# Patient Record
Sex: FEMALE | Race: WHITE | NOT HISPANIC OR LATINO | URBAN - METROPOLITAN AREA
[De-identification: names, ages, dates, MRNs, and addresses within clinical notes are randomized per-mention and may not be internally consistent; named-entity substitution may affect disease eponyms.]

---

## 2020-08-10 ENCOUNTER — TELEPHONE ENCOUNTER (OUTPATIENT)
Dept: URBAN - METROPOLITAN AREA CLINIC 92 | Facility: CLINIC | Age: 70
End: 2020-08-10

## 2020-08-13 ENCOUNTER — TELEPHONE ENCOUNTER (OUTPATIENT)
Dept: URBAN - METROPOLITAN AREA CLINIC 92 | Facility: CLINIC | Age: 70
End: 2020-08-13

## 2020-08-25 ENCOUNTER — OFFICE VISIT (OUTPATIENT)
Dept: RURAL MEDICAL CENTER 4 | Facility: MEDICAL CENTER | Age: 70
End: 2020-08-25
Payer: MEDICARE

## 2020-08-25 DIAGNOSIS — Z86.010 H/O ADENOMATOUS POLYP OF COLON: ICD-10-CM

## 2020-08-25 DIAGNOSIS — K29.60 ADENOPAPILLOMATOSIS GASTRICA: ICD-10-CM

## 2020-08-25 PROCEDURE — 43239 EGD BIOPSY SINGLE/MULTIPLE: CPT | Performed by: INTERNAL MEDICINE

## 2020-08-25 PROCEDURE — G0105 COLORECTAL SCRN; HI RISK IND: HCPCS | Performed by: INTERNAL MEDICINE

## 2020-08-25 PROCEDURE — G9936 PMH PLYP/NEO CO/RECT/JUN/ANS: HCPCS | Performed by: INTERNAL MEDICINE

## 2021-12-13 ENCOUNTER — TELEPHONE ENCOUNTER (OUTPATIENT)
Dept: URBAN - METROPOLITAN AREA CLINIC 23 | Facility: CLINIC | Age: 71
End: 2021-12-13

## 2022-01-26 ENCOUNTER — OFFICE VISIT (OUTPATIENT)
Dept: RURAL CLINIC 8 | Facility: CLINIC | Age: 72
End: 2022-01-26
Payer: MEDICARE

## 2022-01-26 DIAGNOSIS — R14.3 FLATULENCE: ICD-10-CM

## 2022-01-26 DIAGNOSIS — Z86.010 HISTORY OF COLON POLYPS: ICD-10-CM

## 2022-01-26 DIAGNOSIS — K92.1 HEMATOCHEZIA: ICD-10-CM

## 2022-01-26 DIAGNOSIS — K62.89 RECTAL PAIN: ICD-10-CM

## 2022-01-26 PROBLEM — 428283002: Status: ACTIVE | Noted: 2022-01-26

## 2022-01-26 PROCEDURE — 99214 OFFICE O/P EST MOD 30 MIN: CPT | Performed by: INTERNAL MEDICINE

## 2022-01-26 RX ORDER — RALOXIFENE HCL 60 MG
TAKE 1 TABLET (60 MG) BY ORAL ROUTE ONCE DAILY TABLET ORAL 1
Qty: 0 | Refills: 0 | Status: ACTIVE | COMMUNITY
Start: 1900-01-01

## 2022-01-26 RX ORDER — FESOTERODINE FUMARATE 8 MG/1
TAKE 1 TABLET (8 MG) BY ORAL ROUTE ONCE DAILY TABLET, FILM COATED, EXTENDED RELEASE ORAL 1
Qty: 0 | Refills: 0 | Status: ACTIVE | COMMUNITY
Start: 1900-01-01

## 2022-01-26 RX ORDER — LATANOPROST/PF 0.005 %
DROPS OPHTHALMIC (EYE)
Qty: 0 | Refills: 0 | Status: ACTIVE | COMMUNITY
Start: 1900-01-01

## 2022-01-26 RX ORDER — ESTRADIOL 0.1 MG/G
CREAM VAGINAL
Qty: 0 | Refills: 0 | Status: ACTIVE | COMMUNITY
Start: 1900-01-01

## 2022-01-26 RX ORDER — MIRABEGRON 50 MG/1
TAKE 1 TABLET (50 MG) BY ORAL ROUTE ONCE DAILY SWALLOWING WHOLE WITH WATER. DO NOT CRUSH, CHEW AND/OR DIVIDE TABLET, FILM COATED, EXTENDED RELEASE ORAL 1
Qty: 0 | Refills: 0 | Status: ACTIVE | COMMUNITY
Start: 1900-01-01

## 2022-01-26 RX ORDER — ACYCLOVIR 800 MG/1
TABLET ORAL
Qty: 0 | Refills: 0 | Status: ACTIVE | COMMUNITY
Start: 1900-01-01

## 2022-01-26 RX ORDER — DOXEPIN HYDROCHLORIDE 25 MG/1
CAPSULE ORAL
Qty: 0 | Refills: 0 | Status: ACTIVE | COMMUNITY
Start: 1900-01-01

## 2022-01-26 NOTE — HPI-TODAY'S VISIT:
in August of 2020, we did colonoscopy for polyp surveillance. she had no polyps just internal hemorrhoids. she started to have some rectal bleeding in November. she then started to have some blood and clots. she says on the 16th of this month she felt like she had to have a bowel movement and she stood up and had gas come out with blood and clots. she is having some soreness on the inside of the rectum.

## 2022-01-28 ENCOUNTER — OFFICE VISIT (OUTPATIENT)
Dept: RURAL MEDICAL CENTER 4 | Facility: MEDICAL CENTER | Age: 72
End: 2022-01-28
Payer: MEDICARE

## 2022-01-28 DIAGNOSIS — C21.1: ICD-10-CM

## 2022-01-28 PROCEDURE — 45331 SIGMOIDOSCOPY AND BIOPSY: CPT | Performed by: INTERNAL MEDICINE

## 2022-01-28 RX ORDER — LATANOPROST/PF 0.005 %
DROPS OPHTHALMIC (EYE)
Qty: 0 | Refills: 0 | Status: ACTIVE | COMMUNITY
Start: 1900-01-01

## 2022-01-28 RX ORDER — MIRABEGRON 50 MG/1
TAKE 1 TABLET (50 MG) BY ORAL ROUTE ONCE DAILY SWALLOWING WHOLE WITH WATER. DO NOT CRUSH, CHEW AND/OR DIVIDE TABLET, FILM COATED, EXTENDED RELEASE ORAL 1
Qty: 0 | Refills: 0 | Status: ACTIVE | COMMUNITY
Start: 1900-01-01

## 2022-01-28 RX ORDER — ESTRADIOL 0.1 MG/G
CREAM VAGINAL
Qty: 0 | Refills: 0 | Status: ACTIVE | COMMUNITY
Start: 1900-01-01

## 2022-01-28 RX ORDER — RALOXIFENE HCL 60 MG
TAKE 1 TABLET (60 MG) BY ORAL ROUTE ONCE DAILY TABLET ORAL 1
Qty: 0 | Refills: 0 | Status: ACTIVE | COMMUNITY
Start: 1900-01-01

## 2022-01-28 RX ORDER — FESOTERODINE FUMARATE 8 MG/1
TAKE 1 TABLET (8 MG) BY ORAL ROUTE ONCE DAILY TABLET, FILM COATED, EXTENDED RELEASE ORAL 1
Qty: 0 | Refills: 0 | Status: ACTIVE | COMMUNITY
Start: 1900-01-01

## 2022-01-28 RX ORDER — ACYCLOVIR 800 MG/1
TABLET ORAL
Qty: 0 | Refills: 0 | Status: ACTIVE | COMMUNITY
Start: 1900-01-01

## 2022-01-28 RX ORDER — DOXEPIN HYDROCHLORIDE 25 MG/1
CAPSULE ORAL
Qty: 0 | Refills: 0 | Status: ACTIVE | COMMUNITY
Start: 1900-01-01

## 2022-01-31 ENCOUNTER — WEB ENCOUNTER (OUTPATIENT)
Dept: RURAL CLINIC 8 | Facility: CLINIC | Age: 72
End: 2022-01-31

## 2022-01-31 ENCOUNTER — TELEPHONE ENCOUNTER (OUTPATIENT)
Dept: URBAN - METROPOLITAN AREA CLINIC 92 | Facility: CLINIC | Age: 72
End: 2022-01-31

## 2022-02-01 ENCOUNTER — TELEPHONE ENCOUNTER (OUTPATIENT)
Dept: URBAN - METROPOLITAN AREA CLINIC 105 | Facility: CLINIC | Age: 72
End: 2022-02-01

## 2022-02-01 ENCOUNTER — TELEPHONE ENCOUNTER (OUTPATIENT)
Dept: URBAN - METROPOLITAN AREA CLINIC 92 | Facility: CLINIC | Age: 72
End: 2022-02-01

## 2022-02-03 ENCOUNTER — LAB OUTSIDE AN ENCOUNTER (OUTPATIENT)
Dept: RURAL CLINIC 8 | Facility: CLINIC | Age: 72
End: 2022-02-03

## 2022-02-03 PROBLEM — 32110003: Status: ACTIVE | Noted: 2022-02-03

## 2022-02-04 ENCOUNTER — TELEPHONE ENCOUNTER (OUTPATIENT)
Dept: URBAN - METROPOLITAN AREA CLINIC 105 | Facility: CLINIC | Age: 72
End: 2022-02-04

## 2022-02-14 ENCOUNTER — WEB ENCOUNTER (OUTPATIENT)
Dept: RURAL CLINIC 8 | Facility: CLINIC | Age: 72
End: 2022-02-14

## 2022-03-09 ENCOUNTER — OFFICE VISIT (OUTPATIENT)
Dept: RURAL CLINIC 8 | Facility: CLINIC | Age: 72
End: 2022-03-09

## 2022-03-12 ENCOUNTER — WEB ENCOUNTER (OUTPATIENT)
Dept: RURAL CLINIC 8 | Facility: CLINIC | Age: 72
End: 2022-03-12

## 2022-03-16 ENCOUNTER — WEB ENCOUNTER (OUTPATIENT)
Dept: RURAL CLINIC 8 | Facility: CLINIC | Age: 72
End: 2022-03-16

## 2022-03-28 ENCOUNTER — WEB ENCOUNTER (OUTPATIENT)
Dept: RURAL CLINIC 8 | Facility: CLINIC | Age: 72
End: 2022-03-28

## 2022-03-29 ENCOUNTER — WEB ENCOUNTER (OUTPATIENT)
Dept: RURAL CLINIC 8 | Facility: CLINIC | Age: 72
End: 2022-03-29

## 2022-04-04 ENCOUNTER — WEB ENCOUNTER (OUTPATIENT)
Dept: RURAL CLINIC 8 | Facility: CLINIC | Age: 72
End: 2022-04-04

## 2022-04-13 ENCOUNTER — WEB ENCOUNTER (OUTPATIENT)
Dept: RURAL CLINIC 2 | Facility: CLINIC | Age: 72
End: 2022-04-13

## 2022-04-13 ENCOUNTER — OFFICE VISIT (OUTPATIENT)
Dept: RURAL CLINIC 8 | Facility: CLINIC | Age: 72
End: 2022-04-13
Payer: MEDICARE

## 2022-04-13 DIAGNOSIS — K92.1 HEMATOCHEZIA: ICD-10-CM

## 2022-04-13 DIAGNOSIS — C21.0 ANAL CANCER: ICD-10-CM

## 2022-04-13 DIAGNOSIS — R93.5 ABNORMAL ABDOMINAL CT SCAN: ICD-10-CM

## 2022-04-13 DIAGNOSIS — K59.01 SLOW TRANSIT CONSTIPATION: ICD-10-CM

## 2022-04-13 DIAGNOSIS — R10.13 EPIGASTRIC ABDOMINAL PAIN: ICD-10-CM

## 2022-04-13 PROBLEM — 35298007: Status: ACTIVE | Noted: 2022-04-13

## 2022-04-13 PROCEDURE — 99214 OFFICE O/P EST MOD 30 MIN: CPT | Performed by: INTERNAL MEDICINE

## 2022-04-13 RX ORDER — DOXEPIN HYDROCHLORIDE 25 MG/1
CAPSULE ORAL
Qty: 0 | Refills: 0 | Status: ACTIVE | COMMUNITY
Start: 1900-01-01

## 2022-04-13 RX ORDER — ESTRADIOL 0.1 MG/G
CREAM VAGINAL
Qty: 0 | Refills: 0 | Status: ACTIVE | COMMUNITY
Start: 1900-01-01

## 2022-04-13 RX ORDER — RALOXIFENE HCL 60 MG
TAKE 1 TABLET (60 MG) BY ORAL ROUTE ONCE DAILY TABLET ORAL 1
Qty: 0 | Refills: 0 | Status: ACTIVE | COMMUNITY
Start: 1900-01-01

## 2022-04-13 RX ORDER — ACYCLOVIR 800 MG/1
TABLET ORAL
Qty: 0 | Refills: 0 | Status: ACTIVE | COMMUNITY
Start: 1900-01-01

## 2022-04-13 RX ORDER — LATANOPROST/PF 0.005 %
DROPS OPHTHALMIC (EYE)
Qty: 0 | Refills: 0 | Status: ACTIVE | COMMUNITY
Start: 1900-01-01

## 2022-04-13 RX ORDER — MIRABEGRON 50 MG/1
TAKE 1 TABLET (50 MG) BY ORAL ROUTE ONCE DAILY SWALLOWING WHOLE WITH WATER. DO NOT CRUSH, CHEW AND/OR DIVIDE TABLET, FILM COATED, EXTENDED RELEASE ORAL 1
Qty: 0 | Refills: 0 | Status: ACTIVE | COMMUNITY
Start: 1900-01-01

## 2022-04-13 RX ORDER — FESOTERODINE FUMARATE 8 MG/1
TAKE 1 TABLET (8 MG) BY ORAL ROUTE ONCE DAILY TABLET, FILM COATED, EXTENDED RELEASE ORAL 1
Qty: 0 | Refills: 0 | Status: ACTIVE | COMMUNITY
Start: 1900-01-01

## 2022-04-13 NOTE — HPI-TODAY'S VISIT:
in August of 2020, we did colonoscopy for polyp surveillance. she had no polyps just internal hemorrhoids. she started to have some rectal bleeding in November. she then started to have some blood and clots. she says on the 16th of this month she felt like she had to have a bowel movement and she stood up and had gas come out with blood and clots. she is having some soreness on the inside of the rectum. -  04/13/2022:  In January 2022 I performed flexible sigmoidoscopy for this patient because of rectal bleeding.  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  She has been getting chemo and radiation treatments - she comes with her . she is still struggling with some constipation. took mag citrate and got bad uncontrolable diarrhea. was given some Linzess but has not taken but about 1 dose of that.  She has not had any more rectal bleeding.  She reports that she goes soon for a follow-up PET scan. -   We communicated earlier via the patient portal and she was having some upper abdominal discomfort when eating and we have an endoscopy planned for later this month for further investigation of those symptoms.

## 2022-04-14 ENCOUNTER — WEB ENCOUNTER (OUTPATIENT)
Dept: RURAL CLINIC 8 | Facility: CLINIC | Age: 72
End: 2022-04-14

## 2022-04-16 ENCOUNTER — WEB ENCOUNTER (OUTPATIENT)
Dept: RURAL CLINIC 8 | Facility: CLINIC | Age: 72
End: 2022-04-16

## 2022-04-28 ENCOUNTER — OFFICE VISIT (OUTPATIENT)
Dept: RURAL MEDICAL CENTER 4 | Facility: MEDICAL CENTER | Age: 72
End: 2022-04-28
Payer: MEDICARE

## 2022-04-28 DIAGNOSIS — K31.89 ACQUIRED DEFORMITY OF DUODENUM: ICD-10-CM

## 2022-04-28 DIAGNOSIS — R10.13 ABDOMINAL DISCOMFORT, EPIGASTRIC: ICD-10-CM

## 2022-04-28 PROCEDURE — 43239 EGD BIOPSY SINGLE/MULTIPLE: CPT | Performed by: INTERNAL MEDICINE

## 2022-04-28 RX ORDER — DOXEPIN HYDROCHLORIDE 25 MG/1
CAPSULE ORAL
Qty: 0 | Refills: 0 | Status: ACTIVE | COMMUNITY
Start: 1900-01-01

## 2022-04-28 RX ORDER — MIRABEGRON 50 MG/1
TAKE 1 TABLET (50 MG) BY ORAL ROUTE ONCE DAILY SWALLOWING WHOLE WITH WATER. DO NOT CRUSH, CHEW AND/OR DIVIDE TABLET, FILM COATED, EXTENDED RELEASE ORAL 1
Qty: 0 | Refills: 0 | Status: ACTIVE | COMMUNITY
Start: 1900-01-01

## 2022-04-28 RX ORDER — ESTRADIOL 0.1 MG/G
CREAM VAGINAL
Qty: 0 | Refills: 0 | Status: ACTIVE | COMMUNITY
Start: 1900-01-01

## 2022-04-28 RX ORDER — FESOTERODINE FUMARATE 8 MG/1
TAKE 1 TABLET (8 MG) BY ORAL ROUTE ONCE DAILY TABLET, FILM COATED, EXTENDED RELEASE ORAL 1
Qty: 0 | Refills: 0 | Status: ACTIVE | COMMUNITY
Start: 1900-01-01

## 2022-04-28 RX ORDER — ACYCLOVIR 800 MG/1
TABLET ORAL
Qty: 0 | Refills: 0 | Status: ACTIVE | COMMUNITY
Start: 1900-01-01

## 2022-04-28 RX ORDER — RALOXIFENE HCL 60 MG
TAKE 1 TABLET (60 MG) BY ORAL ROUTE ONCE DAILY TABLET ORAL 1
Qty: 0 | Refills: 0 | Status: ACTIVE | COMMUNITY
Start: 1900-01-01

## 2022-04-28 RX ORDER — LATANOPROST/PF 0.005 %
DROPS OPHTHALMIC (EYE)
Qty: 0 | Refills: 0 | Status: ACTIVE | COMMUNITY
Start: 1900-01-01

## 2022-05-16 ENCOUNTER — WEB ENCOUNTER (OUTPATIENT)
Dept: RURAL CLINIC 8 | Facility: CLINIC | Age: 72
End: 2022-05-16

## 2022-05-17 ENCOUNTER — WEB ENCOUNTER (OUTPATIENT)
Dept: RURAL CLINIC 8 | Facility: CLINIC | Age: 72
End: 2022-05-17

## 2022-06-01 ENCOUNTER — WEB ENCOUNTER (OUTPATIENT)
Dept: RURAL CLINIC 8 | Facility: CLINIC | Age: 72
End: 2022-06-01

## 2022-06-23 ENCOUNTER — OFFICE VISIT (OUTPATIENT)
Dept: RURAL MEDICAL CENTER 4 | Facility: MEDICAL CENTER | Age: 72
End: 2022-06-23
Payer: MEDICARE

## 2022-06-23 DIAGNOSIS — Z85.048 HISTORY OF ANAL CANCER: ICD-10-CM

## 2022-06-23 PROCEDURE — 45330 DIAGNOSTIC SIGMOIDOSCOPY: CPT | Performed by: INTERNAL MEDICINE

## 2022-10-05 ENCOUNTER — P2P PATIENT RECORD (OUTPATIENT)
Age: 72
End: 2022-10-05

## 2022-11-09 ENCOUNTER — OFFICE VISIT (OUTPATIENT)
Dept: RURAL CLINIC 8 | Facility: CLINIC | Age: 72
End: 2022-11-09
Payer: MEDICARE

## 2022-11-09 VITALS
SYSTOLIC BLOOD PRESSURE: 103 MMHG | TEMPERATURE: 97.8 F | HEART RATE: 83 BPM | WEIGHT: 119 LBS | BODY MASS INDEX: 19.83 KG/M2 | DIASTOLIC BLOOD PRESSURE: 69 MMHG | HEIGHT: 65 IN

## 2022-11-09 DIAGNOSIS — Z92.21 HISTORY OF CHEMOTHERAPY: ICD-10-CM

## 2022-11-09 DIAGNOSIS — Z92.3 HISTORY OF RADIATION THERAPY: ICD-10-CM

## 2022-11-09 DIAGNOSIS — Z90.49 HISTORY OF CHOLECYSTECTOMY: ICD-10-CM

## 2022-11-09 DIAGNOSIS — R94.8 ABNORMAL GASTROINTESTINAL PET SCAN: ICD-10-CM

## 2022-11-09 DIAGNOSIS — C21.0 ANAL CANCER: ICD-10-CM

## 2022-11-09 PROBLEM — 429479009: Status: ACTIVE | Noted: 2022-11-09

## 2022-11-09 PROBLEM — 428882003: Status: ACTIVE | Noted: 2022-11-09

## 2022-11-09 PROCEDURE — 99213 OFFICE O/P EST LOW 20 MIN: CPT | Performed by: INTERNAL MEDICINE

## 2022-11-09 RX ORDER — MIRABEGRON 50 MG/1
TAKE 1 TABLET (50 MG) BY ORAL ROUTE ONCE DAILY SWALLOWING WHOLE WITH WATER. DO NOT CRUSH, CHEW AND/OR DIVIDE TABLET, FILM COATED, EXTENDED RELEASE ORAL 1
Qty: 0 | Refills: 0 | Status: ACTIVE | COMMUNITY
Start: 1900-01-01

## 2022-11-09 RX ORDER — PREGABALIN 75 MG/1
1 CAPSULE CAPSULE ORAL TWICE A DAY
Status: ACTIVE | COMMUNITY

## 2022-11-09 RX ORDER — ANTIOX #8/OM3/DHA/EPA/LUT/ZEAX 250-2.5 MG
AS DIRECTED CAPSULE ORAL
Status: ACTIVE | COMMUNITY

## 2022-11-09 RX ORDER — RALOXIFENE HCL 60 MG
TAKE 1 TABLET (60 MG) BY ORAL ROUTE ONCE DAILY TABLET ORAL 1
Qty: 0 | Refills: 0 | Status: ACTIVE | COMMUNITY
Start: 1900-01-01

## 2022-11-09 RX ORDER — SPIRONOLACTONE 50 MG/1
1 TABLET TABLET, FILM COATED ORAL ONCE A DAY
Status: ACTIVE | COMMUNITY

## 2022-11-09 RX ORDER — ALPHA-D-GALACTOSIDASE 400 UNIT
1 TABLET AT BEDTIME TABLET ORAL ONCE A DAY
Status: ACTIVE | COMMUNITY

## 2022-11-09 RX ORDER — ACYCLOVIR 800 MG/1
TABLET ORAL
Qty: 0 | Refills: 0 | Status: ACTIVE | COMMUNITY
Start: 1900-01-01

## 2022-11-09 RX ORDER — FESOTERODINE FUMARATE 8 MG/1
TAKE 1 TABLET (8 MG) BY ORAL ROUTE ONCE DAILY TABLET, FILM COATED, EXTENDED RELEASE ORAL 1
Qty: 0 | Refills: 0 | Status: ACTIVE | COMMUNITY
Start: 1900-01-01

## 2022-11-09 RX ORDER — ESTRADIOL 0.1 MG/G
CREAM VAGINAL
Qty: 0 | Refills: 0 | Status: ACTIVE | COMMUNITY
Start: 1900-01-01

## 2022-11-09 RX ORDER — LATANOPROST/PF 0.005 %
DROPS OPHTHALMIC (EYE)
Qty: 0 | Refills: 0 | Status: ACTIVE | COMMUNITY
Start: 1900-01-01

## 2022-11-09 RX ORDER — DOXEPIN HYDROCHLORIDE 25 MG/1
CAPSULE ORAL
Qty: 0 | Refills: 0 | Status: ACTIVE | COMMUNITY
Start: 1900-01-01

## 2022-11-09 NOTE — HPI-TODAY'S VISIT:
in August of 2020, we did colonoscopy for polyp surveillance. she had no polyps just internal hemorrhoids. she started to have some rectal bleeding in November. she then started to have some blood and clots. she says on the 16th of this month she felt like she had to have a bowel movement and she stood up and had gas come out with blood and clots. she is having some soreness on the inside of the rectum. -  04/13/2022:  In January 2022 I performed flexible sigmoidoscopy for this patient because of rectal bleeding.  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  She has been getting chemo and radiation treatments - she comes with her . she is still struggling with some constipation. took mag citrate and got bad uncontrolable diarrhea. was given some Linzess but has not taken but about 1 dose of that.  She has not had any more rectal bleeding.  She reports that she goes soon for a follow-up PET scan. -   We communicated earlier via the patient portal and she was having some upper abdominal discomfort when eating and we have an endoscopy planned for later this month for further investigation of those symptoms. - 11/9/2022: she continues to do well without rectal bleeding.  She has no rectal or anal pain.  She does have a recent PET scan that did show some persistent activity within the anal canal.

## 2022-12-29 ENCOUNTER — OFFICE VISIT (OUTPATIENT)
Dept: RURAL MEDICAL CENTER 4 | Facility: MEDICAL CENTER | Age: 72
End: 2022-12-29
Payer: MEDICARE

## 2022-12-29 DIAGNOSIS — Z85.048 H/O MALIGNANT NEOPLASM OF RECTUM: ICD-10-CM

## 2022-12-29 PROBLEM — 363490009: Status: ACTIVE | Noted: 2022-04-13

## 2022-12-29 PROCEDURE — 45330 DIAGNOSTIC SIGMOIDOSCOPY: CPT | Performed by: INTERNAL MEDICINE

## 2022-12-29 RX ORDER — PREGABALIN 75 MG/1
1 CAPSULE CAPSULE ORAL TWICE A DAY
Status: ACTIVE | COMMUNITY

## 2022-12-29 RX ORDER — ESTRADIOL 0.1 MG/G
CREAM VAGINAL
Qty: 0 | Refills: 0 | Status: ACTIVE | COMMUNITY
Start: 1900-01-01

## 2022-12-29 RX ORDER — RALOXIFENE HCL 60 MG
TAKE 1 TABLET (60 MG) BY ORAL ROUTE ONCE DAILY TABLET ORAL 1
Qty: 0 | Refills: 0 | Status: ACTIVE | COMMUNITY
Start: 1900-01-01

## 2022-12-29 RX ORDER — FESOTERODINE FUMARATE 8 MG/1
TAKE 1 TABLET (8 MG) BY ORAL ROUTE ONCE DAILY TABLET, FILM COATED, EXTENDED RELEASE ORAL 1
Qty: 0 | Refills: 0 | Status: ACTIVE | COMMUNITY
Start: 1900-01-01

## 2022-12-29 RX ORDER — MIRABEGRON 50 MG/1
TAKE 1 TABLET (50 MG) BY ORAL ROUTE ONCE DAILY SWALLOWING WHOLE WITH WATER. DO NOT CRUSH, CHEW AND/OR DIVIDE TABLET, FILM COATED, EXTENDED RELEASE ORAL 1
Qty: 0 | Refills: 0 | Status: ACTIVE | COMMUNITY
Start: 1900-01-01

## 2022-12-29 RX ORDER — SPIRONOLACTONE 50 MG/1
1 TABLET TABLET, FILM COATED ORAL ONCE A DAY
Status: ACTIVE | COMMUNITY

## 2022-12-29 RX ORDER — ACYCLOVIR 800 MG/1
TABLET ORAL
Qty: 0 | Refills: 0 | Status: ACTIVE | COMMUNITY
Start: 1900-01-01

## 2022-12-29 RX ORDER — LATANOPROST/PF 0.005 %
DROPS OPHTHALMIC (EYE)
Qty: 0 | Refills: 0 | Status: ACTIVE | COMMUNITY
Start: 1900-01-01

## 2022-12-29 RX ORDER — ANTIOX #8/OM3/DHA/EPA/LUT/ZEAX 250-2.5 MG
AS DIRECTED CAPSULE ORAL
Status: ACTIVE | COMMUNITY

## 2022-12-29 RX ORDER — ALPHA-D-GALACTOSIDASE 400 UNIT
1 TABLET AT BEDTIME TABLET ORAL ONCE A DAY
Status: ACTIVE | COMMUNITY

## 2022-12-29 RX ORDER — DOXEPIN HYDROCHLORIDE 25 MG/1
CAPSULE ORAL
Qty: 0 | Refills: 0 | Status: ACTIVE | COMMUNITY
Start: 1900-01-01

## 2023-10-04 PROBLEM — 112071000119105: Status: ACTIVE | Noted: 2023-10-04

## 2023-10-11 ENCOUNTER — OFFICE VISIT (OUTPATIENT)
Dept: RURAL CLINIC 8 | Facility: CLINIC | Age: 73
End: 2023-10-11
Payer: MEDICARE

## 2023-10-11 ENCOUNTER — DASHBOARD ENCOUNTERS (OUTPATIENT)
Age: 73
End: 2023-10-11

## 2023-10-11 VITALS
SYSTOLIC BLOOD PRESSURE: 142 MMHG | HEART RATE: 85 BPM | WEIGHT: 110 LBS | HEIGHT: 65 IN | BODY MASS INDEX: 18.33 KG/M2 | DIASTOLIC BLOOD PRESSURE: 85 MMHG | TEMPERATURE: 97.1 F

## 2023-10-11 DIAGNOSIS — Z92.21 HISTORY OF CHEMOTHERAPY: ICD-10-CM

## 2023-10-11 DIAGNOSIS — R63.4 UNINTENDED WEIGHT LOSS: ICD-10-CM

## 2023-10-11 DIAGNOSIS — D50.9 IRON DEFICIENCY ANEMIA, UNSPECIFIED IRON DEFICIENCY ANEMIA TYPE: ICD-10-CM

## 2023-10-11 DIAGNOSIS — Z87.19 HISTORY OF SMALL BOWEL OBSTRUCTION: ICD-10-CM

## 2023-10-11 DIAGNOSIS — Z85.048 HISTORY OF ANAL CANCER: ICD-10-CM

## 2023-10-11 DIAGNOSIS — Z86.010 HISTORY OF COLON POLYPS: ICD-10-CM

## 2023-10-11 DIAGNOSIS — Z92.3 HISTORY OF RADIATION THERAPY: ICD-10-CM

## 2023-10-11 PROBLEM — 87522002: Status: ACTIVE | Noted: 2023-10-11

## 2023-10-11 PROCEDURE — 99214 OFFICE O/P EST MOD 30 MIN: CPT | Performed by: INTERNAL MEDICINE

## 2023-10-11 RX ORDER — DOXEPIN HYDROCHLORIDE 25 MG/1
CAPSULE ORAL
Qty: 0 | Refills: 0 | Status: DISCONTINUED | COMMUNITY
Start: 1900-01-01

## 2023-10-11 RX ORDER — RALOXIFENE HCL 60 MG
TAKE 1 TABLET (60 MG) BY ORAL ROUTE ONCE DAILY TABLET ORAL 1
Qty: 0 | Refills: 0 | Status: ACTIVE | COMMUNITY
Start: 1900-01-01

## 2023-10-11 RX ORDER — SPIRONOLACTONE 50 MG/1
1 TABLET TABLET, FILM COATED ORAL ONCE A DAY
Status: DISCONTINUED | COMMUNITY

## 2023-10-11 RX ORDER — ACYCLOVIR 800 MG/1
TABLET ORAL
Qty: 0 | Refills: 0 | Status: ACTIVE | COMMUNITY
Start: 1900-01-01

## 2023-10-11 RX ORDER — SODIUM, POTASSIUM,MAG SULFATES 17.5-3.13G
177 ML SOLUTION, RECONSTITUTED, ORAL ORAL
Qty: 1 KIT | Refills: 0 | OUTPATIENT
Start: 2023-10-04 | End: 2023-10-05

## 2023-10-11 RX ORDER — FESOTERODINE FUMARATE 8 MG/1
TAKE 1 TABLET (8 MG) BY ORAL ROUTE ONCE DAILY TABLET, FILM COATED, EXTENDED RELEASE ORAL 1
Qty: 0 | Refills: 0 | Status: ACTIVE | COMMUNITY
Start: 1900-01-01

## 2023-10-11 RX ORDER — ANTIOX #8/OM3/DHA/EPA/LUT/ZEAX 250-2.5 MG
AS DIRECTED CAPSULE ORAL
Status: ACTIVE | COMMUNITY

## 2023-10-11 RX ORDER — MIRABEGRON 50 MG/1
TAKE 1 TABLET (50 MG) BY ORAL ROUTE ONCE DAILY SWALLOWING WHOLE WITH WATER. DO NOT CRUSH, CHEW AND/OR DIVIDE TABLET, FILM COATED, EXTENDED RELEASE ORAL 1
Qty: 0 | Refills: 0 | Status: ACTIVE | COMMUNITY
Start: 1900-01-01

## 2023-10-11 RX ORDER — BISACODYL 5 MG
TAKE 4 TABLET, DELAYED RELEASE (ENTERIC COATED) ORAL
Qty: 4 | OUTPATIENT
Start: 2023-10-04 | End: 2023-10-05

## 2023-10-11 RX ORDER — ESTRADIOL 0.1 MG/G
CREAM VAGINAL
Qty: 0 | Refills: 0 | Status: ACTIVE | COMMUNITY
Start: 1900-01-01

## 2023-10-11 RX ORDER — LATANOPROST/PF 0.005 %
DROPS OPHTHALMIC (EYE)
Qty: 0 | Refills: 0 | Status: ACTIVE | COMMUNITY
Start: 1900-01-01

## 2023-10-11 RX ORDER — PREGABALIN 75 MG/1
1 CAPSULE CAPSULE ORAL TWICE A DAY
Status: ACTIVE | COMMUNITY

## 2023-10-11 RX ORDER — ALPHA-D-GALACTOSIDASE 400 UNIT
1 TABLET AT BEDTIME TABLET ORAL ONCE A DAY
Status: ACTIVE | COMMUNITY

## 2023-10-11 NOTE — HPI-TODAY'S VISIT:
in August of 2020, we did colonoscopy for polyp surveillance. she had no polyps just internal hemorrhoids. she started to have some rectal bleeding in November. she then started to have some blood and clots. she says on the 16th of this month she felt like she had to have a bowel movement and she stood up and had gas come out with blood and clots. she is having some soreness on the inside of the rectum. -  04/13/2022:  In January 2022 I performed flexible sigmoidoscopy for this patient because of rectal bleeding.  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  She has been getting chemo and radiation treatments - she comes with her . she is still struggling with some constipation. took mag citrate and got bad uncontrolable diarrhea. was given some Linzess but has not taken but about 1 dose of that.  She has not had any more rectal bleeding.  She reports that she goes soon for a follow-up PET scan. -   We communicated earlier via the patient portal and she was having some upper abdominal discomfort when eating and we have an endoscopy planned for later this month for further investigation of those symptoms. - 11/9/2022: she continues to do well without rectal bleeding.  She has no rectal or anal pain.  She does have a recent PET scan that did show some persistent activity within the anal canal. - 10/11/2023:  In December of 2022 I performed this patient's follow-up flexible sigmoidoscopy due to a history of squamous cell carcinoma of the anal canal.  There was no evidence of residual or recurrent tumor.  There was a scar in the location of the prior mass.  There were a few small angioectasias in the distal rectum consistent with post radiation effect.  Last full colonoscopy was in August of 2020. she does have a history of colon polyps. pt says that she developed bad fatigue in August - pt says she was in ICU with bacteremia and distended abdomen. had port removal. was transferrred to [place] had NGT. had negative ROSY and had surgery for bowel obstruction. she developed apparently an abscess around the bladder she she was given a PICC  line and IV merrem.

## 2023-10-20 ENCOUNTER — TELEPHONE ENCOUNTER (OUTPATIENT)
Dept: RURAL CLINIC 2 | Facility: CLINIC | Age: 73
End: 2023-10-20

## 2023-11-22 ENCOUNTER — TELEPHONE ENCOUNTER (OUTPATIENT)
Dept: RURAL CLINIC 2 | Facility: CLINIC | Age: 73
End: 2023-11-22

## 2023-12-28 ENCOUNTER — OFFICE VISIT (OUTPATIENT)
Dept: RURAL MEDICAL CENTER 4 | Facility: MEDICAL CENTER | Age: 73
End: 2023-12-28
Payer: MEDICARE

## 2023-12-28 DIAGNOSIS — K29.60 ADENOPAPILLOMATOSIS GASTRICA: ICD-10-CM

## 2023-12-28 DIAGNOSIS — D12.2 ADENOMA OF ASCENDING COLON: ICD-10-CM

## 2023-12-28 DIAGNOSIS — K22.89 DILATATION OF ESOPHAGUS: ICD-10-CM

## 2023-12-28 DIAGNOSIS — Z85.048 H/O MALIGNANT NEOPLASM OF RECTUM: ICD-10-CM

## 2023-12-28 DIAGNOSIS — D50.9 ANEMIA: ICD-10-CM

## 2023-12-28 PROCEDURE — 45385 COLONOSCOPY W/LESION REMOVAL: CPT | Performed by: INTERNAL MEDICINE

## 2023-12-28 PROCEDURE — 43239 EGD BIOPSY SINGLE/MULTIPLE: CPT | Performed by: INTERNAL MEDICINE

## 2023-12-28 RX ORDER — FESOTERODINE FUMARATE 8 MG/1
TAKE 1 TABLET (8 MG) BY ORAL ROUTE ONCE DAILY TABLET, FILM COATED, EXTENDED RELEASE ORAL 1
Qty: 0 | Refills: 0 | Status: ACTIVE | COMMUNITY
Start: 1900-01-01

## 2023-12-28 RX ORDER — ESTRADIOL 0.1 MG/G
CREAM VAGINAL
Qty: 0 | Refills: 0 | Status: ACTIVE | COMMUNITY
Start: 1900-01-01

## 2023-12-28 RX ORDER — MIRABEGRON 50 MG/1
TAKE 1 TABLET (50 MG) BY ORAL ROUTE ONCE DAILY SWALLOWING WHOLE WITH WATER. DO NOT CRUSH, CHEW AND/OR DIVIDE TABLET, FILM COATED, EXTENDED RELEASE ORAL 1
Qty: 0 | Refills: 0 | Status: ACTIVE | COMMUNITY
Start: 1900-01-01

## 2023-12-28 RX ORDER — ACYCLOVIR 800 MG/1
TABLET ORAL
Qty: 0 | Refills: 0 | Status: ACTIVE | COMMUNITY
Start: 1900-01-01

## 2023-12-28 RX ORDER — ANTIOX #8/OM3/DHA/EPA/LUT/ZEAX 250-2.5 MG
AS DIRECTED CAPSULE ORAL
Status: ACTIVE | COMMUNITY

## 2023-12-28 RX ORDER — RALOXIFENE HCL 60 MG
TAKE 1 TABLET (60 MG) BY ORAL ROUTE ONCE DAILY TABLET ORAL 1
Qty: 0 | Refills: 0 | Status: ACTIVE | COMMUNITY
Start: 1900-01-01

## 2023-12-28 RX ORDER — ALPHA-D-GALACTOSIDASE 400 UNIT
1 TABLET AT BEDTIME TABLET ORAL ONCE A DAY
Status: ACTIVE | COMMUNITY

## 2023-12-28 RX ORDER — PREGABALIN 75 MG/1
1 CAPSULE CAPSULE ORAL TWICE A DAY
Status: ACTIVE | COMMUNITY

## 2023-12-28 RX ORDER — LATANOPROST/PF 0.005 %
DROPS OPHTHALMIC (EYE)
Qty: 0 | Refills: 0 | Status: ACTIVE | COMMUNITY
Start: 1900-01-01